# Patient Record
Sex: MALE | Race: WHITE
[De-identification: names, ages, dates, MRNs, and addresses within clinical notes are randomized per-mention and may not be internally consistent; named-entity substitution may affect disease eponyms.]

---

## 2017-05-17 ENCOUNTER — HOSPITAL ENCOUNTER (EMERGENCY)
Dept: HOSPITAL 10 - FTE | Age: 22
Discharge: HOME | End: 2017-05-17
Payer: COMMERCIAL

## 2017-05-17 VITALS
HEIGHT: 66 IN | WEIGHT: 169.76 LBS | BODY MASS INDEX: 27.28 KG/M2 | BODY MASS INDEX: 27.28 KG/M2 | HEIGHT: 66 IN | WEIGHT: 169.76 LBS

## 2017-05-17 DIAGNOSIS — H93.13: Primary | ICD-10-CM

## 2017-05-17 PROCEDURE — 99283 EMERGENCY DEPT VISIT LOW MDM: CPT
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
## 2017-05-28 ENCOUNTER — HOSPITAL ENCOUNTER (EMERGENCY)
Dept: HOSPITAL 10 - FTE | Age: 22
LOS: 1 days | Discharge: LEFT BEFORE DISCHARGE | End: 2017-05-29
Payer: COMMERCIAL

## 2017-05-28 ENCOUNTER — HOSPITAL ENCOUNTER (EMERGENCY)
Dept: HOSPITAL 10 - FTE | Age: 22
LOS: 1 days | Discharge: HOME | End: 2017-05-29
Payer: COMMERCIAL

## 2017-05-28 VITALS
WEIGHT: 162.04 LBS | HEIGHT: 66 IN | BODY MASS INDEX: 26.04 KG/M2 | BODY MASS INDEX: 26.04 KG/M2 | HEIGHT: 66 IN | WEIGHT: 162.04 LBS

## 2017-05-28 VITALS
BODY MASS INDEX: 27.99 KG/M2 | BODY MASS INDEX: 27.99 KG/M2 | HEIGHT: 66 IN | WEIGHT: 174.17 LBS | HEIGHT: 66 IN | WEIGHT: 174.17 LBS

## 2017-05-28 DIAGNOSIS — K21.9: Primary | ICD-10-CM

## 2017-05-28 DIAGNOSIS — S19.9XXA: Primary | ICD-10-CM

## 2017-05-28 DIAGNOSIS — J98.2: ICD-10-CM
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
## 2017-05-28 PROCEDURE — 72125 CT NECK SPINE W/O DYE: CPT

## 2017-05-28 PROCEDURE — 93005 ELECTROCARDIOGRAM TRACING: CPT

## 2017-05-28 PROCEDURE — 71250 CT THORAX DX C-: CPT

## 2017-05-28 NOTE — RADRPT
PROCEDURE:   CT cervical spine without contrast 

 

CLINICAL INDICATION:  Neck pain, trauma

 

TECHNIQUE:   CT scan of the cervical spine was performed on a multidetector CT scanner..  No IV cont
rast was administered.  Coronal and sagittal reformatted images were obtained from the axial source 
images. Images were reviewed on a high-resolution PACS workstation.  

 

CTDI = 22.40 mGy

 DLP: 659.17 mGy-cm

 

 

One or more of the following dose reduction techniques were used:  

Automated exposure control

Adjustment of the mA and / or kV according to patient size

Use of iterative reconstruction technique.

 

COMPARISON:   None. 

 

FINDINGS:

 

There is no CT evidence of acute fracture or subluxation.

 

Vertebral body heights and alignment are preserved.

 

The intervertebral disk spaces are normal.

 

There is no evidence of central canal or foraminal stenosis at any level.

 

The prevertebral soft tissues are normal.

 

Mild para septal emphysematous changes are seen throughout the lungs bilaterally. There is mild pneu
momediastinum  Nonspecific sclerosis within the lamina of C4 on the right is noted.  

 

 

 

IMPRESSION:

 

1.  No CT evidence of acute fracture or subluxation of the cervical spine.

 

2.  Paraseptal emphysematous changes at the lung apices bilaterally and mild pneumomediastinum, to b
e correlated clinically. Consider chest CT for further assessment as clinically warranted.

 

RPTAT: UU

_____________________________________________ 

.Mariano Barrett MD, MD           Date    Time 

Electronically viewed and signed by .Mariano Barrett MD, MD on 05/28/2017 13:38 

 

D:  05/28/2017 13:38  T:  05/28/2017 13:38

.K/
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
## 2017-05-29 NOTE — ERD
ER Documentation


Chief Complaint


Date/Time


DATE: 5/29/17 


TIME: 01:59


Chief Complaint


Burning sensation in the chest and in the throat area started today





HPI


22-year-old male presents to emergency department for complaints of burning 

sensation in the mid chest area radiating to the neck area worse after eating 

today. Patient complaints of burning pain, 3/10 scale, radiates to the throat 

area, worst than eating. Patient denies any chest pain or palpitations. Patient 

denies any dizziness. Patient denies any numbness or tingling. Patient denies 

any neck joint pain. Patient denies any trauma in the neck. Patient denies any 

dyspnea on exertion or dyspnea on lying down.





ROS


All systems reviewed and are negative except as per history of present illness.





Medications


Home Meds


Active Scripts


Magaldrate/Simethicone* (Mylanta*) 355 Ml Susp, 30 ML PO QID Y for 

GASTROINTESTINAL UPSET, #1 BOTTLE


   Prov:STAR VELASQUEZ NP         5/29/17


Omeprazole* (Omeprazole*) 20 Mg Capsule.dr, 20 MG PO DAILY, #30


   Prov:STAR VELASQUEZ NP         5/29/17


Ibuprofen* (Motrin*) 600 Mg Tab, 600 MG PO Q6, #30 TAB


   Prov:CAROL YIN         5/17/17


Pseudoephedrine Hcl (Sudafed 12 Hour) 120 Mg Tablet.sa, 120 MG PO BID for 3 Days


   Prov:CAROL YIN         5/17/17


Guaifenesin-Dextromethorphan* (Robitussin* DM) 100MG/10MG/5ML Syrup, 10 ML PO 

Q6H Y for COUGH for 5 Days, ML


   Prov:AIXA RUELAS PA-C         10/26/16


Cetirizine Hcl* (Zyrtec*) 10 Mg Capsule, 10 MG PO DAILY, #10 TAB.CHEW


   Prov:AIXA RUELAS PA-C         10/26/16


Azithromycin* (Zithromax*) 250 Mg Tablet, 250 MG PO .ZPACK AS DIRECTED, #6 TAB


   TAKE 500 MG (2 TABS) THE FIRST DAY THEN 250 MG (1 TAB) DAYS 2-5


   Prov:AIXA RUELAS PA-C         10/26/16





Allergies


Allergies:  


Coded Allergies:  


     No Known Allergy (Unverified , 5/28/17)





PMhx/Soc


History of Surgery:  No


Anesthesia Reaction:  No


Hx Neurological Disorder:  No


Hx Respiratory Disorders:  No


Hx Cardiac Disorders:  No


Hx Psychiatric Problems:  No


Hx Miscellaneous Medical Probl:  No


Hx Alcohol Use:  Yes (sometimes)


Hx Substance Use:  No


Hx Tobacco Use:  No


Smoking Status:  Never smoker





FmHx


Family History:  No coronary disease, No diabetes, No other





Physical Exam


Vitals





Vital Signs








  Date Time  Temp Pulse Resp B/P Pulse Ox O2 Delivery O2 Flow Rate FiO2


 


5/29/17 00:00 97.6 69 18 133/67 98   








Physical Exam


GENERAL:  The patient is well developed and appropriate for usual state of 

health, in no apparent distress.


CHEST:  Clear to auscultation bilaterally. There are no rales, wheezes or 

rhonchi. 


HEART:  Regular rate and rhythm. No murmurs, clicks, rubs or gallops. No S3 or 

S4.


ABDOMEN:  Soft, nontender and nondistended. Good bowel sounds. No rebound or 

guarding. No gross peritonitis. No gross organomegaly or masses. No Madison sign 

or McBurney point tenderness.


BACK:  No midline or flank tenderness.


EXTREMITIES:  Equal pulses bilaterally. There is no peripheral clubbing, 

cyanosis or edema. No focal swelling or erythema. Full range of motion. Grossly 

neurovascularly intact.


NEURO:  Alert and oriented. Cranial nerves 2-12 intact. Motor strength in all 4 

extremities with 5/5 strength.  Sensation grossly intact. Normal speech and 

gait. 


SKIN:  There is no apparent rash or petechia. The skin is warm and dry.


HEMATOLOGIC AND LYMPHATIC:  There is no evidence of excessive bruising or 

lymphedema. No gross cervical, axillary, or inguinal lymphadenopathy.


Results 24 hrs





 Current Medications








 Medications


  (Trade)  Dose


 Ordered  Sig/Delores


 Route


 PRN Reason  Start Time


 Stop Time Status Last Admin


Dose Admin


 


 Miscellaneous


 Medication


  (Gi Cocktail (2))  40 ml  ONCE  ONCE


 PO


   5/29/17 02:00


 5/29/17 02:01 DC 5/29/17 02:45


 





GI cocktail was given here in emergency department, verbalized feeling much 

better afterwards.





EKG was done, read by me and is sinus brachycardia with a rate of 49 bpm, 

normal axis, there is no ST changes or changes in the EKG that indicates any 

cardiac emergencies at this time. Patient's EKG was also reviewed by Dr. Padgett. 

Impression: no acute findings on EKG





Procedures/MDM


Medical Decision Making: She is symptoms is likely is consistent with acid 

reflux disease. Improved after taking GI cocktail here in emergency department. 

There is low suspicion for cardiopulmonary emergencies at this time. Patient 

has low risk factors. EKG is normal, there is no changes in the EKG that 

indicates cardiac emergencies. Radiology exam is not indicated at this time. 

There is low suspicion for aortic aneurysm, myocardial infarction, pneumothorax

, pleural effusion, pulmonary embolism, or any other cardiopulmonary 

emergencies at this time. 


Prescription was given for omeprazole, Mylanta, is advised to follow up with 

primary doctor in 2-3 days for reevaluation of symptoms. Patient was advised to 

return to emergency department for any worsening symptoms.





Dispostion: Home. Stable





Departure


Diagnosis:  


 Primary Impression:  


 GERD (gastroesophageal reflux disease)


 Esophagitis presence:  without esophagitis  Qualified Code:  K21.9 - 

Gastroesophageal reflux disease without esophagitis


Condition:  Stable


Patient Instructions:  Gastroesophageal Reflux Disease (GERD)





Additional Instructions:  


Prescription was given for omeprazole, Mylanta, is advised to follow up with 

primary doctor in 2-3 days for reevaluation of symptoms. Patient was advised to 

return to emergency department for any worsening symptoms.











STAR VELASQUEZ NP May 29, 2017 02:02

## 2018-12-19 ENCOUNTER — HOSPITAL ENCOUNTER (EMERGENCY)
Dept: HOSPITAL 91 - FTE | Age: 23
LOS: 1 days | Discharge: HOME | End: 2018-12-20
Payer: SELF-PAY

## 2018-12-19 ENCOUNTER — HOSPITAL ENCOUNTER (EMERGENCY)
Age: 23
LOS: 1 days | Discharge: HOME | End: 2018-12-20

## 2018-12-19 DIAGNOSIS — R07.9: ICD-10-CM

## 2018-12-19 DIAGNOSIS — F41.9: Primary | ICD-10-CM

## 2018-12-19 PROCEDURE — 80053 COMPREHEN METABOLIC PANEL: CPT

## 2018-12-19 PROCEDURE — 85730 THROMBOPLASTIN TIME PARTIAL: CPT

## 2018-12-19 PROCEDURE — 84484 ASSAY OF TROPONIN QUANT: CPT

## 2018-12-19 PROCEDURE — 93005 ELECTROCARDIOGRAM TRACING: CPT

## 2018-12-19 PROCEDURE — 85025 COMPLETE CBC W/AUTO DIFF WBC: CPT

## 2018-12-19 PROCEDURE — 99285 EMERGENCY DEPT VISIT HI MDM: CPT

## 2018-12-19 PROCEDURE — 71046 X-RAY EXAM CHEST 2 VIEWS: CPT

## 2018-12-19 PROCEDURE — 85610 PROTHROMBIN TIME: CPT

## 2018-12-19 RX ADMIN — ASPIRIN 325 MG ORAL TABLET 1 MG: 325 PILL ORAL at 23:56

## 2018-12-19 RX ADMIN — LORAZEPAM 1 MG: 1 TABLET ORAL at 23:56

## 2018-12-20 LAB
ADD MAN DIFF?: NO
ALANINE AMINOTRANSFERASE: 30 IU/L (ref 13–69)
ALBUMIN/GLOBULIN RATIO: 1.81
ALBUMIN: 4.9 G/DL (ref 3.3–4.9)
ALKALINE PHOSPHATASE: 59 IU/L (ref 42–121)
ANION GAP: 14 (ref 5–13)
ASPARTATE AMINO TRANSFERASE: 40 IU/L (ref 15–46)
BASOPHIL #: 0 10^3/UL (ref 0–0.1)
BASOPHILS %: 0.5 % (ref 0–2)
BILIRUBIN,DIRECT: 0 MG/DL (ref 0–0.2)
BILIRUBIN,TOTAL: 0.7 MG/DL (ref 0.2–1.3)
BLOOD UREA NITROGEN: 15 MG/DL (ref 7–20)
CALCIUM: 9.7 MG/DL (ref 8.4–10.2)
CARBON DIOXIDE: 27 MMOL/L (ref 21–31)
CHLORIDE: 101 MMOL/L (ref 97–110)
CREATININE: 0.89 MG/DL (ref 0.61–1.24)
EOSINOPHILS #: 0.1 10^3/UL (ref 0–0.5)
EOSINOPHILS %: 0.7 % (ref 0–7)
GLOBULIN: 2.7 G/DL (ref 1.3–3.2)
GLUCOSE: 100 MG/DL (ref 70–220)
HEMATOCRIT: 45.5 % (ref 42–52)
HEMOGLOBIN: 15.5 G/DL (ref 14–18)
IMMATURE GRANS #M: 0.03 10^3/UL (ref 0–0.03)
IMMATURE GRANS % (M): 0.4 % (ref 0–0.43)
INR: 0.89
LYMPHOCYTES #: 1.5 10^3/UL (ref 0.8–2.9)
LYMPHOCYTES %: 17.3 % (ref 15–51)
MEAN CORPUSCULAR HEMOGLOBIN: 30.1 PG (ref 29–33)
MEAN CORPUSCULAR HGB CONC: 34.1 G/DL (ref 32–37)
MEAN CORPUSCULAR VOLUME: 88.3 FL (ref 82–101)
MEAN PLATELET VOLUME: 9.8 FL (ref 7.4–10.4)
MONOCYTE #: 0.7 10^3/UL (ref 0.3–0.9)
MONOCYTES %: 8.4 % (ref 0–11)
NEUTROPHIL #: 6.2 10^3/UL (ref 1.6–7.5)
NEUTROPHILS %: 72.7 % (ref 39–77)
NUCLEATED RED BLOOD CELLS #: 0 10^3/UL (ref 0–0)
NUCLEATED RED BLOOD CELLS%: 0 /100WBC (ref 0–0)
PARTIAL THROMBOPLASTIN TIME: 26.2 SEC (ref 23–35)
PLATELET COUNT: 250 10^3/UL (ref 140–415)
POTASSIUM: 4.3 MMOL/L (ref 3.5–5.1)
PROTIME: 12.1 SEC (ref 11.9–14.9)
PT RATIO: 0.9
RED BLOOD COUNT: 5.15 10^6/UL (ref 4.7–6.1)
RED CELL DISTRIBUTION WIDTH: 11.9 % (ref 11.5–14.5)
SODIUM: 142 MMOL/L (ref 135–144)
TOTAL PROTEIN: 7.6 G/DL (ref 6.1–8.1)
TROPONIN-I: < 0.012 NG/ML (ref 0–0.12)
WHITE BLOOD COUNT: 8.5 10^3/UL (ref 4.8–10.8)